# Patient Record
Sex: FEMALE | Race: WHITE | NOT HISPANIC OR LATINO | URBAN - METROPOLITAN AREA
[De-identification: names, ages, dates, MRNs, and addresses within clinical notes are randomized per-mention and may not be internally consistent; named-entity substitution may affect disease eponyms.]

---

## 2017-02-04 ENCOUNTER — INPATIENT (INPATIENT)
Facility: HOSPITAL | Age: 77
LOS: 0 days | Discharge: HOME | DRG: 812 | End: 2017-02-04
Attending: INTERNAL MEDICINE | Admitting: INTERNAL MEDICINE
Payer: COMMERCIAL

## 2017-02-04 VITALS
RESPIRATION RATE: 16 BRPM | OXYGEN SATURATION: 98 % | WEIGHT: 139.99 LBS | SYSTOLIC BLOOD PRESSURE: 138 MMHG | HEIGHT: 61 IN | TEMPERATURE: 98 F | DIASTOLIC BLOOD PRESSURE: 84 MMHG | HEART RATE: 79 BPM

## 2017-02-04 VITALS
OXYGEN SATURATION: 96 % | SYSTOLIC BLOOD PRESSURE: 156 MMHG | HEART RATE: 67 BPM | RESPIRATION RATE: 17 BRPM | TEMPERATURE: 98 F | DIASTOLIC BLOOD PRESSURE: 75 MMHG

## 2017-02-04 DIAGNOSIS — E89.0 POSTPROCEDURAL HYPOTHYROIDISM: Chronic | ICD-10-CM

## 2017-02-04 DIAGNOSIS — Z29.9 ENCOUNTER FOR PROPHYLACTIC MEASURES, UNSPECIFIED: ICD-10-CM

## 2017-02-04 DIAGNOSIS — R63.8 OTHER SYMPTOMS AND SIGNS CONCERNING FOOD AND FLUID INTAKE: ICD-10-CM

## 2017-02-04 DIAGNOSIS — D50.0 IRON DEFICIENCY ANEMIA SECONDARY TO BLOOD LOSS (CHRONIC): ICD-10-CM

## 2017-02-04 DIAGNOSIS — Z90.49 ACQUIRED ABSENCE OF OTHER SPECIFIED PARTS OF DIGESTIVE TRACT: Chronic | ICD-10-CM

## 2017-02-04 DIAGNOSIS — E03.8 OTHER SPECIFIED HYPOTHYROIDISM: ICD-10-CM

## 2017-02-04 PROCEDURE — 85730 THROMBOPLASTIN TIME PARTIAL: CPT

## 2017-02-04 PROCEDURE — 86850 RBC ANTIBODY SCREEN: CPT

## 2017-02-04 PROCEDURE — 99285 EMERGENCY DEPT VISIT HI MDM: CPT | Mod: 25

## 2017-02-04 PROCEDURE — 36430 TRANSFUSION BLD/BLD COMPNT: CPT

## 2017-02-04 PROCEDURE — 85025 COMPLETE CBC W/AUTO DIFF WBC: CPT

## 2017-02-04 PROCEDURE — 84436 ASSAY OF TOTAL THYROXINE: CPT

## 2017-02-04 PROCEDURE — 86901 BLOOD TYPING SEROLOGIC RH(D): CPT

## 2017-02-04 PROCEDURE — 86923 COMPATIBILITY TEST ELECTRIC: CPT

## 2017-02-04 PROCEDURE — 99285 EMERGENCY DEPT VISIT HI MDM: CPT | Mod: GC

## 2017-02-04 PROCEDURE — 86900 BLOOD TYPING SEROLOGIC ABO: CPT

## 2017-02-04 PROCEDURE — 36415 COLL VENOUS BLD VENIPUNCTURE: CPT

## 2017-02-04 PROCEDURE — 80053 COMPREHEN METABOLIC PANEL: CPT

## 2017-02-04 PROCEDURE — P9016: CPT

## 2017-02-04 PROCEDURE — 84443 ASSAY THYROID STIM HORMONE: CPT

## 2017-02-04 PROCEDURE — 85610 PROTHROMBIN TIME: CPT

## 2017-02-04 RX ORDER — LEVOTHYROXINE SODIUM 125 MCG
1 TABLET ORAL
Qty: 0 | Refills: 0 | COMMUNITY

## 2017-02-04 RX ORDER — PANTOPRAZOLE SODIUM 20 MG/1
40 TABLET, DELAYED RELEASE ORAL
Qty: 0 | Refills: 0 | Status: DISCONTINUED | OUTPATIENT
Start: 2017-02-04 | End: 2017-02-04

## 2017-02-04 RX ORDER — LEVOTHYROXINE SODIUM 125 MCG
75 TABLET ORAL DAILY
Qty: 0 | Refills: 0 | Status: DISCONTINUED | OUTPATIENT
Start: 2017-02-04 | End: 2017-02-04

## 2017-02-04 RX ORDER — PANTOPRAZOLE SODIUM 20 MG/1
1 TABLET, DELAYED RELEASE ORAL
Qty: 30 | Refills: 0 | OUTPATIENT
Start: 2017-02-04 | End: 2017-03-06

## 2017-02-04 RX ADMIN — PANTOPRAZOLE SODIUM 40 MILLIGRAM(S): 20 TABLET, DELAYED RELEASE ORAL at 19:13

## 2017-02-04 NOTE — DISCHARGE NOTE ADULT - MEDICATION SUMMARY - MEDICATIONS TO TAKE
I will START or STAY ON the medications listed below when I get home from the hospital:    Protonix 40 mg oral delayed release tablet  -- 1 tab(s) by mouth once a day (in the morning)  -- It is very important that you take or use this exactly as directed.  Do not skip doses or discontinue unless directed by your doctor.  Obtain medical advice before taking any non-prescription drugs as some may affect the action of this medication.  Swallow whole.  Do not crush.    -- Indication: For GI bleed    Synthroid 75 mcg (0.075 mg) oral tablet  -- 1 tab(s) by mouth once a day  -- Indication: For Hypothyroidism due to Hashimoto's thyroiditis

## 2017-02-04 NOTE — ED PROVIDER NOTE - ATTENDING CONTRIBUTION TO CARE
recent brbpr last week, not in past few days.  Has been feeling generalized weakness/fatigue.  went to doctor and had slight hgb drop from baseline around 12. Followed up again yesterday for another lab draw and found to have hgb 6.  denies bloody/black stool recently.  Refusing GI eval, rectal exam, further workup other than transfusion.  States she is healthy and would like transfusion and discharge.  Has outpatient GI specialist for followup next week

## 2017-02-04 NOTE — ED PROVIDER NOTE - CONSTITUTIONAL, MLM
normal... Well appearing, well nourished, pale in appearance,  awake, alert, oriented to person, place, time/situation and in no apparent distress.

## 2017-02-04 NOTE — DISCHARGE NOTE ADULT - CARE PROVIDER_API CALL
Matt Lima), Cardiovascular Medicine  [address] [place] [address]  Phone: [phone]  Fax: [phone]

## 2017-02-04 NOTE — DISCHARGE NOTE ADULT - CARE PROVIDERS DIRECT ADDRESSES
[email or web address]

## 2017-02-04 NOTE — H&P ADULT. - ASSESSMENT
77 y/o F w/ a PMHx of hypothyroidism presenting w/ weakness likely 2/2 symptomatic anemia 2/2 blood loss 2/2 NSAID use.

## 2017-02-04 NOTE — DISCHARGE NOTE ADULT - HOSPITAL COURSE
75 y/o F w/ a PMHx of hypothyroidism, initially presenting with symptomatic anemia secondary to GI bleed likely secondary to NSAIDs, found w/ a Hgb of 5.9. Pt received 2U of PRBCs. Pt was discharged to home w/ the isntruction to follow up with Dr. Lima for further evaluation.

## 2017-02-04 NOTE — H&P ADULT. - PROBLEM SELECTOR PLAN 4
DVT PPx: holding of HSQ in the setting of GI bleed  GI PPx: c/w Protonix    Dispo: Pending clinical improvement    FULL CODE

## 2017-02-04 NOTE — H&P ADULT. - NEUROLOGICAL DETAILS
responds to verbal commands/normal strength/sensation intact/alert and oriented x 3/cranial nerves intact/superficial reflexes intact

## 2017-02-04 NOTE — DISCHARGE NOTE ADULT - CARE PLAN
Principal Discharge DX:	Anemia due to blood loss  Goal:	Improvement of your symptoms  Instructions for follow-up, activity and diet:	You presented to us with anemia likely due to your sudden GI bleed. We transfused you with blood after which your symptoms improved. We are also recommending that you start taking protonix daily. Please follow up with Dr. Lima for further evaluation. Also please avoid taking further NSAIDs, as they may be causing your bleeding.  Secondary Diagnosis:	Hypothyroidism due to Hashimoto's thyroiditis  Instructions for follow-up, activity and diet:	Please continue with your home medications.

## 2017-02-04 NOTE — H&P ADULT. - PROBLEM SELECTOR PLAN 1
Pt w/ a hgb of 5.9. Receiving 2U of PRBC in the ED. Possibly 2/2 NSAID use, however, w/ BRBPR.   - c/w PRBC transfusion - f/u post-transfusion CBC  - c/t trend H&H, maintain active T&S, transfuse if Hgb <7  - Pt refusing rectal exam at this time - consider GI consult  - Start Protonix 40mg IV BID  - Start Clear Liquid diet

## 2017-02-04 NOTE — ED PROVIDER NOTE - MEDICAL DECISION MAKING DETAILS
76F PMH of hypothyroid p/w weakness/palpitations 2/2 anemia (H/H: 6.0/18.1) likely 2/2 3-4 bloody stools 1 week ago, sent in by Dr. Matt Lima for blood transfusion.

## 2017-02-04 NOTE — DISCHARGE NOTE ADULT - PLAN OF CARE
Improvement of your symptoms You presented to us with anemia likely due to your sudden GI bleed. We transfused you with blood after which your symptoms improved. We are also recommending that you start taking protonix daily. Please follow up with Dr. Lima for further evaluation. Also please avoid taking further NSAIDs, as they may be causing your bleeding. Please continue with your home medications.

## 2017-02-04 NOTE — ED PROVIDER NOTE - OBJECTIVE STATEMENT
76F PMH of hypothyroid p/w weakness and sent in by Dr. Lima for H/H of 6.0/18.1.  Pt. reports that on Friday 1/27 she had 3-4 solid stools streaked with bright red blood. She went to her PCP and found to have a Hgb drop from 12.6 to 12.1.  She states the bloody stools resolved on Saturday 1/28 and she has not had any since. Pt. had been taking 2 advil tablets 4 times a day for the past month prior to this episode. She reports no hx of GI ulcer/gastritis. She f/u with Dr. Lima (family friend) and a repeat CBC showed an H/H of 6.0/18.1. He advised her to come to [facility] for a blood transfusion.  She also reports weakness, palpitations on exertion. She denies dizziness, CP/SOB, HA, syncope.

## 2017-02-04 NOTE — DISCHARGE NOTE ADULT - PATIENT PORTAL LINK FT
“You can access the [facility] Patient Portal, offered by [facility], by registering with the following website: [email or web address]”

## 2017-02-04 NOTE — ED ADULT NURSE NOTE - OBJECTIVE STATEMENT
Pt states she was sent in to ED by her doctor for low hemoglobin. Pt states a week ago she was taking advil 3-4 times a day for several days for back and tooth pain, last Thursday started having bloody stools until last Saturday. Pt has not had any bloody stools since. Denies abdominal pain, N/V or active bleeding. Pt ambulating with steady gait, NAD.

## 2017-02-09 DIAGNOSIS — R53.1 WEAKNESS: ICD-10-CM

## 2017-02-09 DIAGNOSIS — E89.0 POSTPROCEDURAL HYPOTHYROIDISM: ICD-10-CM

## 2017-02-09 DIAGNOSIS — E06.3 AUTOIMMUNE THYROIDITIS: ICD-10-CM
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
## 2017-02-09 DIAGNOSIS — D62 ACUTE POSTHEMORRHAGIC ANEMIA: ICD-10-CM

## 2017-02-09 DIAGNOSIS — T39.395A ADVERSE EFFECT OF OTHER NONSTEROIDAL ANTI-INFLAMMATORY DRUGS [NSAID], INITIAL ENCOUNTER: ICD-10-CM

## 2018-10-18 PROBLEM — E03.8 OTHER SPECIFIED HYPOTHYROIDISM: Chronic | Status: ACTIVE | Noted: 2017-02-04

## 2018-10-19 ENCOUNTER — APPOINTMENT (OUTPATIENT)
Dept: NEUROLOGY | Facility: CLINIC | Age: 78
End: 2018-10-19
Payer: MEDICARE

## 2018-10-19 PROBLEM — Z00.00 ENCOUNTER FOR PREVENTIVE HEALTH EXAMINATION: Status: ACTIVE | Noted: 2018-10-19

## 2018-10-19 PROCEDURE — 95819 EEG AWAKE AND ASLEEP: CPT

## 2019-05-28 NOTE — PROVIDER CONTACT NOTE (CRITICAL VALUE NOTIFICATION) - PERSON GIVING RESULT:
lab [Initial Consultation] : an initial consultation for [Chest Pain] : chest pain [Parents] : parents [Medical Records] : medical records

## 2022-08-08 NOTE — ED ADULT TRIAGE NOTE - AS TEMP SITE
oral Klisyri Pregnancy And Lactation Text: It is unknown if this medication can harm a developing fetus or if it is excreted in breast milk.